# Patient Record
Sex: FEMALE | Race: OTHER | URBAN - METROPOLITAN AREA
[De-identification: names, ages, dates, MRNs, and addresses within clinical notes are randomized per-mention and may not be internally consistent; named-entity substitution may affect disease eponyms.]

---

## 2017-10-09 NOTE — PATIENT DISCUSSION
The types of intraocular lenses were reviewed with the patient along with a discussion of their various strengths and weaknesses. pt elects Custom IOL goal mateus, +Amvisc Plus,  Lengthy discussion with patient about giving up natural nearsightedness to achieve good distance VA.  Pt accepts need for spectacles at near and intermediate.

## 2017-11-15 NOTE — PATIENT DISCUSSION
pt elects Custom, goal emmetropia; pt declines monovision, pt accepts need for spectacles at near and intermediate.

## 2018-03-05 NOTE — PATIENT DISCUSSION
***The patient is interested in refractive cataract surgery. After discussing all options for becoming less dependent on glasses after surgery, the patient has elected distance vision with astigmatism correction if needed. The anticipated visual outcome is satisfactory distance. The patient is aware they will depend on glasses for all near/intermediate vision. ***

## 2018-03-29 NOTE — PATIENT DISCUSSION
S/P PCIOL, OD_ - STABLE, DOING WELL. OK TO PROCEED WITH FELLOW EYE SURGERY. PT REPORTS THAT THEY ARE HAPPY WITH THE OUTCOME OF THE OPERATIVE EYE AND READY TO PURSUE SX IN THE FELLOW EYE.

## 2018-03-29 NOTE — PATIENT DISCUSSION
***This patient had refractive cataract surgery performed. A monofocal IOL was placed to achieve a target refraction of plano (which should provide them with satisfactory distance). ***

## 2018-04-11 NOTE — PATIENT DISCUSSION
***This patient had refractive cataract surgery performed. A monofocal IOL was placed to achieve a target refraction of plano (which should provide them with satisfactory distance vision with the aid of glasses/contact lenses). ***

## 2019-10-07 NOTE — PATIENT DISCUSSION
Good postoperative appearance. Patient instructed to continue follow-up with referring provider with Dr. José Miguel Rosado.

## 2022-03-09 ENCOUNTER — ESTABLISHED PATIENT (OUTPATIENT)
Dept: URBAN - METROPOLITAN AREA CLINIC 10 | Facility: CLINIC | Age: 77
End: 2022-03-09

## 2022-03-09 DIAGNOSIS — H47.233: ICD-10-CM

## 2022-03-09 PROCEDURE — 92133 CPTRZD OPH DX IMG PST SGM ON: CPT

## 2022-03-09 PROCEDURE — 92083 EXTENDED VISUAL FIELD XM: CPT

## 2022-03-09 PROCEDURE — 92012 INTRM OPH EXAM EST PATIENT: CPT

## 2022-03-09 ASSESSMENT — TONOMETRY
OS_IOP_MMHG: 16
OD_IOP_MMHG: 14

## 2022-06-29 RX ORDER — SOLIFENACIN SUCCINATE 10 MG/1
TABLET, FILM COATED ORAL
COMMUNITY

## 2022-06-29 RX ORDER — PRAVASTATIN SODIUM 40 MG
TABLET ORAL
COMMUNITY

## 2022-08-03 ENCOUNTER — ESTABLISHED PATIENT (OUTPATIENT)
Dept: URBAN - METROPOLITAN AREA CLINIC 10 | Facility: CLINIC | Age: 77
End: 2022-08-03

## 2022-08-03 DIAGNOSIS — Z96.1: ICD-10-CM

## 2022-08-03 DIAGNOSIS — H52.4: ICD-10-CM

## 2022-08-03 DIAGNOSIS — H47.232: ICD-10-CM

## 2022-08-03 DIAGNOSIS — H43.313: ICD-10-CM

## 2022-08-03 PROCEDURE — 92014 COMPRE OPH EXAM EST PT 1/>: CPT

## 2022-08-03 PROCEDURE — 92250 FUNDUS PHOTOGRAPHY W/I&R: CPT

## 2022-08-03 PROCEDURE — 92015 DETERMINE REFRACTIVE STATE: CPT

## 2022-08-03 ASSESSMENT — KERATOMETRY
OD_AXISANGLE2_DEGREES: 70
OS_AXISANGLE2_DEGREES: 85
OS_K2POWER_DIOPTERS: 45.50
OD_AXISANGLE_DEGREES: 160
OD_K2POWER_DIOPTERS: 45.25
OS_K1POWER_DIOPTERS: 44.25
OS_AXISANGLE_DEGREES: 175
OD_K1POWER_DIOPTERS: 44.50

## 2022-08-03 ASSESSMENT — VISUAL ACUITY
OD_CC: 20/25
OU_CC: 20/25
OS_CC: 20/25

## 2022-08-03 ASSESSMENT — TONOMETRY
OS_IOP_MMHG: 17
OD_IOP_MMHG: 16

## 2024-03-13 NOTE — PATIENT DISCUSSION
Retinal tear and detachment warning symptoms reviewed and patient instructed to call immediately if increasing floaters, flashes, or decreasing peripheral vision. denies street drug use